# Patient Record
Sex: FEMALE | Race: AMERICAN INDIAN OR ALASKA NATIVE
[De-identification: names, ages, dates, MRNs, and addresses within clinical notes are randomized per-mention and may not be internally consistent; named-entity substitution may affect disease eponyms.]

---

## 2017-09-25 ENCOUNTER — HOSPITAL ENCOUNTER (OUTPATIENT)
Dept: HOSPITAL 5 - VAS | Age: 72
Discharge: HOME | End: 2017-09-25
Attending: HOSPITALIST
Payer: MEDICARE

## 2017-09-25 DIAGNOSIS — M79.604: Primary | ICD-10-CM

## 2017-09-27 NOTE — VASCULAR LAB REPORT
Right Lower Extremity Venous Duplex Study:



Reason for Exam: Pain of the right lower extremity.



Comments on the Right:

All veins visualized are freely compressible without evidence of 

internal echogenicity.  Flow is spontaneous and phasic throughout. No 

evidence of acute or chronic thrombus is seen in any of the vessels 

visualized.



Comments on the Left:

A limited duplex study was done of the proximal veins of the left lower 

extremity. All veins visualized are freely compressible without 

evidence of internal echogenicity.  Flow is spontaneous and phasic 

throughout. No evidence of acute or chronic thrombus is seen in any of 

the vessels visualized.



Impression:

No evidence of acute or chronic deep venous thrombosis in the right 

lower extremity.

## 2017-10-24 ENCOUNTER — HOSPITAL ENCOUNTER (OUTPATIENT)
Dept: HOSPITAL 5 - SPVWC | Age: 72
Discharge: HOME | End: 2017-10-24
Attending: INTERNAL MEDICINE
Payer: MEDICARE

## 2017-10-24 DIAGNOSIS — E04.2: Primary | ICD-10-CM

## 2017-10-24 PROCEDURE — 76536 US EXAM OF HEAD AND NECK: CPT

## 2017-10-25 NOTE — ULTRASOUND REPORT
THYROID ULTRASOUND:10/24/17 10:59:00





CLINICAL: History of a right neck mass separate from the thyroid.  

Ultrasound guided needle biopsy on 02/16/15 revealed benign thyroid 

follicular cells and colloid consistent with ectopic thyroid tissue.



FINDINGS: High-resolution ultrasound demonstrated an enlarged and 

multinodular thyroid.  The right lobe measures 3.9 x 1.8 x 1.9cm.  The 

left lobe measures 5.1 x 3.0 x 3.0. The isthmus measures 3 mm AP 

thickness. Stable right thyroid cysts and heterogeneous complex nodules 

which are typical of benign hyperplastic nodules.  The largest right 

nodule measures 1.5 x 1.1 x 1.5 cm.  A heterogeneous solid nodule of 

the left lobe is not significantly changed and measures 4.9 x 2.5 cm 

compared to 4.3 x 2.3 cm on the last exam.  Slight differences in 

measurements are attributable to differences in technique.  Stable 

right ectopic thyroid tissue measuring 1.6 x 0.5 x 0.9 cm.





IMPRESSION: Multinodular thyroid without significant change.  Stable 

right ectopic thyroid nodule in the right neck separate from and 

superior to the right thyroid lobe.

## 2018-06-29 ENCOUNTER — HOSPITAL ENCOUNTER (EMERGENCY)
Dept: HOSPITAL 5 - ED | Age: 73
Discharge: HOME | End: 2018-06-29
Payer: MEDICARE

## 2018-06-29 VITALS — SYSTOLIC BLOOD PRESSURE: 136 MMHG | DIASTOLIC BLOOD PRESSURE: 76 MMHG

## 2018-06-29 DIAGNOSIS — M06.9: ICD-10-CM

## 2018-06-29 DIAGNOSIS — Z91.013: ICD-10-CM

## 2018-06-29 DIAGNOSIS — Z88.2: ICD-10-CM

## 2018-06-29 DIAGNOSIS — Z91.040: ICD-10-CM

## 2018-06-29 DIAGNOSIS — M54.32: Primary | ICD-10-CM

## 2018-06-29 PROCEDURE — 99282 EMERGENCY DEPT VISIT SF MDM: CPT

## 2018-06-29 NOTE — EMERGENCY DEPARTMENT REPORT
ED Back Pain/Injury HPI





- General


Chief Complaint: Extremity Injury, Lower


Stated Complaint: BACK PAIN


Time Seen by Provider: 06/29/18 13:49


Source: patient


Limitations: No Limitations





- History of Present Illness


Initial Comments: 





Patient is a 73-year-old American female with a past medical history of lower 

back pain and rheumatoid arthritis who states for the past 2-3 days she's had 

some increased left hip pain radiating to the buttock and left lower extremity.

  Patient states she's had this before at times.  Patient is taking Ultram of 

this opiate prescriptions states that he wears off relatively quickly.  Patient 

denies any bowel or bladder dysfunction at this time.  Patient denies any 

trauma.  Patient states the pain is a 8 IN severity and is worse when she is 

moving standing or sitting.





- Related Data


 Home Medications











 Medication  Instructions  Recorded  Confirmed  Last Taken


 


Aliskiren/Hydrochlorothiazide 1 each PO DAILY 02/16/15 02/16/15 06/28/16





[Tekturna Hct 300-25 mg Tablet]    


 


Ca/D3/Mag Ox/Zinc//Luis Antonio/Bor 1 each PO BID 02/16/15 02/16/15 06/28/16





[Caltrate 600+D Plus Tab Chew]    


 


Cod Liver Oil 1 each PO DAILY 02/16/15 02/16/15 06/28/16


 


Ibandronate Sodium [Boniva] 150 mg PO QMONTH 02/16/15 02/16/15 06/28/16


 


Leflunomide 20 mg PO DAILY 02/16/15 02/16/15 06/28/16


 


Prednisone 5 mg PO DAILY 02/16/15 02/16/15 06/28/16


 


Arava  06/28/16 06/28/16


 


Loperamide [Imodium] 2 mg PO 06/28/16 06/28/16


 


Tekturna 300 mg PO DAILY 06/28/16 06/28/16 06/28/16


 


amLODIPine  06/28/16 06/29/16








 Previous Rx's











 Medication  Instructions  Recorded  Last Taken  Type


 


Ibuprofen [Motrin 600 MG tab] 600 mg PO Q8H PRN #30 tablet 12/17/16 Unknown Rx


 


methOCARBAMOL [Robaxin TAB] 500 mg PO BID #30 tab 12/17/16 Unknown Rx


 


HYDROcodone/APAP 5-325 [[place] 1 each PO Q4HR PRN #12 tablet 06/29/18 Unknown Rx





5/325]    


 


Ibuprofen [Motrin] 600 mg PO Q8H PRN #20 tablet 06/29/18 Unknown Rx


 


methOCARBAMOL [Robaxin TAB] 500 mg PO Q6H PRN #10 tablet 06/29/18 Unknown Rx











 Allergies











Allergy/AdvReac Type Severity Reaction Status Date / Time


 


Iodine and Iodide Containing Allergy Intermediate Vomiting Verified 06/29/18 13:

07





Produc     


 


latex Allergy Intermediate Rash Verified 06/29/18 13:07


 


Sulfa (Sulfonamide Allergy Intermediate Diarrhea Verified 06/29/18 13:07





Antibiotics)     


 


shellfish derived AdvReac  Nausea Verified 06/29/18 13:07














ED Review of Systems


ROS: 


Stated complaint: BACK PAIN


Other details as noted in HPI





Comment: All other systems reviewed and negative





ED Past Medical Hx





- Past Medical History


rheumatoid arthritis, lung CA (remission 2018)


Family history: no significant family history





ED Back Pain Physical Exam





- Exam


General: 


Vital signs noted. No distress. Alert and acting appropriately.





Back/Abdomen: Yes Sacroiliac Tenderness (left lower back and buttock pain), No 

Abdominal Tenderness, No Perithoracic Tenderness, No Perilumbar Tenderness, No 

Flank Tenderness, No Straight Leg Raise Pain


Neuro: Yes Normal Sensation, Yes Normal DTR's, Yes Normal Gait, No Motor 

Weakness





ED Course


 Vital Signs











  06/29/18





  13:02


 


Temperature 98.3 F


 


Pulse Rate 82


 


Respiratory 20





Rate 


 


Blood Pressure 171/83


 


O2 Sat by Pulse 99





Oximetry 











Critical care attestation.: 


If time is entered above; I have spent that time in minutes in the direct care 

of this critically ill patient, excluding procedure time.








ED Disposition


Clinical Impression: 


Sciatica


Qualifiers:


 Laterality: left Qualified Code(s): M54.32 - Sciatica, left side





Disposition: DC-01 TO HOME OR SELFCARE


Is pt being admited?: No


Does the pt Need Aspirin: No


Condition: Stable


Instructions:  Lumbar Radiculopathy (ED)


Referrals: 


ROBSON RODRIGUES MD [Staff Physician] - 3-5 Days

## 2018-09-12 ENCOUNTER — HOSPITAL ENCOUNTER (OUTPATIENT)
Dept: HOSPITAL 5 - NM | Age: 73
Discharge: HOME | End: 2018-09-12
Attending: INTERNAL MEDICINE
Payer: MEDICARE

## 2018-09-12 DIAGNOSIS — Z90.710: ICD-10-CM

## 2018-09-12 DIAGNOSIS — Z90.2: ICD-10-CM

## 2018-09-12 DIAGNOSIS — I10: ICD-10-CM

## 2018-09-12 DIAGNOSIS — K21.9: ICD-10-CM

## 2018-09-12 DIAGNOSIS — C34.91: Primary | ICD-10-CM

## 2018-09-12 DIAGNOSIS — M19.90: ICD-10-CM

## 2018-09-12 DIAGNOSIS — Z90.89: ICD-10-CM

## 2018-09-12 PROCEDURE — 78306 BONE IMAGING WHOLE BODY: CPT

## 2018-09-12 PROCEDURE — A9503 TC99M MEDRONATE: HCPCS

## 2018-09-12 NOTE — NUCLEAR MEDICINE REPORT
BONE SCAN:



History: Lung cancer.



Comparison: No relevant comparison at this facility.



After injection of isotope, gamma camera imaging of the bony

system was done.  There is a normal uptake of isotope throughout the 

bony structures without areas of significantly increased or decreased 

uptake. Normal uptake in the urinary system is seen.



IMPRESSION:

Negative bone scan.

## 2019-10-21 ENCOUNTER — HOSPITAL ENCOUNTER (OUTPATIENT)
Dept: HOSPITAL 5 - SPVWC | Age: 74
Discharge: HOME | End: 2019-10-21
Attending: INTERNAL MEDICINE
Payer: MEDICARE

## 2019-10-21 DIAGNOSIS — E04.2: Primary | ICD-10-CM

## 2019-10-21 DIAGNOSIS — M19.90: ICD-10-CM

## 2019-10-21 DIAGNOSIS — Z90.710: ICD-10-CM

## 2019-10-21 DIAGNOSIS — I10: ICD-10-CM

## 2019-10-21 DIAGNOSIS — K21.9: ICD-10-CM

## 2019-10-21 PROCEDURE — 76536 US EXAM OF HEAD AND NECK: CPT

## 2019-10-21 NOTE — ULTRASOUND REPORT
ULTRASOUND THYROID



INDICATION / CLINICAL INFORMATION:

MULTINODULAR GOITER.



COMPARISON: 

10/15/2018



FINDINGS:



RIGHT LOBE: Size = 4.8 x 1.7 x 1.9 cm. 

- Echogenicity: Diffusely heterogeneous.

- Vascularity: Normal.

- Nodules: A complex dominant nodule in the mid right lobe measures 1.3 x 1.0 x 1.2 cm compared to 1.
2 x 0.8 x 1.1 cm on the last exam. A second similar complex nodule is more inferior and measures 1.4 
x 0.9 x 1.2 cm compared to 1.3 x 0.8 x 0.6 cm on the last exam. Several benign cysts throughout the r
ight lobe.



LEFT LOBE: Size = 6.1 x 2.9 x 3.8 cm. 

- Echogenicity: Diffusely heterogeneous.

- Vascularity: Normal.

- Nodules: The left lobe has a diffusely nodular pattern and could be measured as a single nodule bettina
suring 5.3 x 2.5 x 3.8 cm. However, it has not changed in appearance since the last exam.



ISTHMUS: 2 tiny benign cyst. Thickness = 0.5 cm. 

-Nodules: None..



LYMPH NODES: No abnormal lymph nodes.



PARATHYROID GLANDS: No abnormal parathyroid gland.



ADDITIONAL FINDINGS: None.



IMPRESSION:

1. Multinodular goiter without significant change compared to the last exam.

2. The complex nodules of the right lobe have characteristics typical of benign hyperplastic nodules.


3. No suspicious nodules.



Note: Nodule size based on mean (average) size of 3 dimensions.



Note: Nodules < 1 cm do not typically require follow-up or FNA unless there are suspicious features (
EDUARDO, 2015)



------------------------------------------------------------------------

ACR TI-RADS Thyroid Nodule Recommendations

------------------------------------------------------------------------

TI-RADS 1 (0 points) -- Benign. No FNA or follow-up.

TI-RADS 2 (1-2 points) -- Not suspicious. No FNA or follow-up.

TI-RADS 3 (3 points) -- Mildly suspicious. Follow up in 1 year if  1.5 cm. FNA if  2.5 cm. 

TI-RADS 4 (4-6 points) -- Moderately suspicious. Follow up in 1 year if  1.0 cm. FNA if  1.5 cm. 

TI-RADS 5 (7+ points) -- Highly suspicious. Follow up in 1 year if  0.5 cm. FNA if  1.0 cm. 





Signer Name: Nehemias Cazares MD 

Signed: 10/21/2019 4:04 PM

 Workstation Name: [id number]

## 2020-02-21 ENCOUNTER — HOSPITAL ENCOUNTER (OUTPATIENT)
Dept: HOSPITAL 5 - VAS | Age: 75
Discharge: HOME | End: 2020-02-21
Attending: INTERNAL MEDICINE
Payer: MEDICARE

## 2020-02-21 DIAGNOSIS — I10: Primary | ICD-10-CM

## 2020-02-21 PROCEDURE — 93975 VASCULAR STUDY: CPT

## 2020-02-21 NOTE — VASCULAR LAB REPORT
Duplex Doppler renal ultrasound with spectral analysis



INDICATION:

ESSENTIAL HYPERTENSION.



COMPARISON:

No relevant prior imaging study available. 



FINDINGS:

RIGHT KIDNEY: Size: 9.7 cm. 

Echogenicity: Normal. Cortical thickness: Normal.

Stones: None. 

Hydronephrosis: None. 

Cyst or mass: None.  



LEFT KIDNEY: Size: 11.3 cm. 

Echogenicity: Normal. Cortical thickness: Normal. 

Stones: None. 

Hydronephrosis: None. 

Cyst or mass: None.  



Urinary Bladder: No significant abnormality.

Free Fluid: None.

Additional Findings: None.



Peak systolic velocities (centimeters per second):

Abdominal aorta: 111



Proximal right renal artery:  115

Mid right renal artery:  119

Distal right renal artery:  128

Right Renal-Aorta ratio (RAR):  1.3



Proximal left renal artery:  114

Mid left renal artery:  115

Distal left renal artery:  87

Left renal-Aortic ratio (RAR):  1.1



Segmental right renal artery resistive indices: 0.69 - 0.77

Segmental left renal artery resistive indices: 0.67 - 0.82



IMPRESSION

1. No acute sonographic abnormality of the kidneys. 

2. Bilateral intrarenal resistive indices are mildly elevated. Main renal arteries are unremarkable.



Renal artery Stenosis Criteria:

Normal:                     RAR: < 3.5             PSV: <200 cm/s

<60% stenosis:        RAR: < 3.5             PSV: >200 cm/s

>=60% stenosis:      RAR: >=3.5            PSV: >=200 cm/s



Resistive Indices Criteria:

Normal value: ~0.60

Upper limits of normal: ~0.70



Signer Name: Bello Ramirez MD 

Signed: 2/21/2020 12:05 PM

 Workstation Name: [id number]